# Patient Record
Sex: MALE | URBAN - METROPOLITAN AREA
[De-identification: names, ages, dates, MRNs, and addresses within clinical notes are randomized per-mention and may not be internally consistent; named-entity substitution may affect disease eponyms.]

---

## 2021-05-28 ENCOUNTER — APPOINTMENT (RX ONLY)
Dept: URBAN - METROPOLITAN AREA CLINIC 374 | Facility: CLINIC | Age: 26
Setting detail: DERMATOLOGY
End: 2021-05-28

## 2021-05-28 DIAGNOSIS — L40.8 OTHER PSORIASIS: ICD-10-CM

## 2021-05-28 PROCEDURE — ? PRESCRIPTION

## 2021-05-28 PROCEDURE — ? COUNSELING

## 2021-05-28 PROCEDURE — 99204 OFFICE O/P NEW MOD 45 MIN: CPT

## 2021-05-28 PROCEDURE — ? MEDICATION COUNSELING

## 2021-05-28 PROCEDURE — ? PRESCRIPTION MEDICATION MANAGEMENT

## 2021-05-28 RX ORDER — CLOBETASOL PROPIONATE 0.5 MG/G
1 CREAM TOPICAL BID
Qty: 1 | Refills: 3 | Status: ERX | COMMUNITY
Start: 2021-05-28

## 2021-05-28 RX ORDER — KETOCONAZOLE 20 MG/ML
1 SHAMPOO, SUSPENSION TOPICAL QDAY
Qty: 1 | Refills: 3 | Status: ERX | COMMUNITY
Start: 2021-05-28

## 2021-05-28 RX ORDER — CLOBETASOL PROPIONATE 0.5 MG/ML
1 SOLUTION TOPICAL QHS
Qty: 1 | Refills: 3 | Status: ERX | COMMUNITY
Start: 2021-05-28

## 2021-05-28 RX ADMIN — CLOBETASOL PROPIONATE 1: 0.5 SOLUTION TOPICAL at 00:00

## 2021-05-28 RX ADMIN — KETOCONAZOLE 1: 20 SHAMPOO, SUSPENSION TOPICAL at 00:00

## 2021-05-28 RX ADMIN — CLOBETASOL PROPIONATE 1: 0.5 CREAM TOPICAL at 00:00

## 2021-05-28 ASSESSMENT — LOCATION ZONE DERM: LOCATION ZONE: SCALP

## 2021-05-28 ASSESSMENT — LOCATION SIMPLE DESCRIPTION DERM: LOCATION SIMPLE: POSTERIOR SCALP

## 2021-05-28 ASSESSMENT — LOCATION DETAILED DESCRIPTION DERM: LOCATION DETAILED: POSTERIOR MID-PARIETAL SCALP

## 2021-05-28 ASSESSMENT — SEVERITY ASSESSMENT: HOW SEVERE IS THIS PATIENT'S CONDITION?: MILD

## 2021-05-28 ASSESSMENT — BSA PSORIASIS: % BODY COVERED IN PSORIASIS: 4

## 2021-05-28 ASSESSMENT — PGA PSORIASIS: PGA PSORIASIS 2020: MILD

## 2021-05-28 NOTE — PROCEDURE: MEDICATION COUNSELING
Xelbonniez Pregnancy And Lactation Text: This medication is Pregnancy Category D and is not considered safe during pregnancy.  The risk during breast feeding is also uncertain.

## 2021-05-28 NOTE — PROCEDURE: PRESCRIPTION MEDICATION MANAGEMENT
Initiate Treatment: clobetasol 0.05 % scalp solution Qhs: Apply a few scattered drops into scalp QHS.\\nketoconazole 2 % shampoo Qday: Shampoo scalp, let sit 3-5 min, then rinse every wash\\nclobetasol 0.05 % topical cream Bid: Apply to aa of leg for rash bid
Detail Level: Zone
Render In Strict Bullet Format?: No

## 2021-05-28 NOTE — PROCEDURE: MEDICATION COUNSELING
I will SWITCH the dose or number of times a day I take the medications listed below when I get home from the hospital:  None Tranexamic Acid Pregnancy And Lactation Text: It is unknown if this medication is safe during pregnancy or breast feeding.

## 2021-08-18 NOTE — PROCEDURE: MEDICATION COUNSELING
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

## 2024-08-06 NOTE — PROCEDURE: MEDICATION COUNSELING
Medicare Wellness Visit  Plan for Preventive Care     Harjeet,     Your blood pressure is good. Heart and lungs sound good.     I recommend efforts to lose weight by reducing caloric intake and with exercise.     You will receive a Cologuard kit. Send it back with stool.     I recommend having a tetanus booster (tdap) at the pharmacy.     I also recommend the Shingles vaccine at the pharmacy, 2 doses 2 to 6 months apart. Medicare will cover this at the pharmacy.     I recommend having the Prevnar 20 pneumonia shot. Medicare will cover that here or at the pharmacy.      Schedule a lab appointment to have your blood test done fasting. (No eating or drinking except for water for 8 hours.)    Let me see you in a year, 8/12/2025 at 8:30 AM for your annual exam. .     A good way for you to stay healthy is to use preventive care.  Medicare covers many services that can help you stay healthy.* The goal of these services is to find any health problems as quickly as possible. Finding problems early can help make them easier to treat.  Your personal plan below lists the services you may need and when they are due.      Health Maintenance Summary       DTaP/Tdap/Td Vaccine (1 - Tdap)  Overdue since 1/2/2002    Shingles Vaccine (1 of 2)  Never done    Colorectal Cancer Screen (Colonoscopy - Every 10 Years)  Overdue since 4/12/2020    Pneumococcal Vaccine 65+ (1 of 1 - PCV)  Never done    COVID-19 Vaccine (1 - 2023-24 season)  Never done    Medicare Advantage- Medicare Wellness Visit (Yearly - January to December)  Overdue since 1/1/2024    Influenza Vaccine (1)  Next due on 9/1/2024    Depression Screening (Yearly)  Next due on 8/5/2025    Hepatitis C Screening   Completed    Meningococcal Vaccine   Aged Out    Hepatitis B Vaccine (For Physician/[facility]C Discussion)   Aged Out    HPV Vaccine   Aged Out             Preventive Care for Women and Men    Heart Screenings (Cardiovascular):  Blood tests are used to check your cholesterol,  lipid and triglyceride levels. High levels can increase your risk for heart disease and stroke. High levels can be treated with medications, diet and exercise. Lowering your levels can help keep your heart and blood vessels healthy.  Your provider will order these tests if they are needed.    An ultrasound is done to see if you have an abdominal aortic aneurysm (AAA).  This is an enlargement of one of the main blood vessels that delivers blood to the body.   In the [place], 9,000 deaths are caused by AAA.  You may not even know you have this problem and as many as 1 in 3 people will have a serious problem if it is not treated.  Early diagnosis allows for more effective treatment and cure.  If you have a family history of AAA or are a male age 65-75 who has smoked, you are at higher risk of an AAA.  Your provider can order this test, if needed.    Colorectal Screening:  There are many tests that are used to check for cancer of your colon and rectum. You and your provider should discuss what test is best for you and when to have it done.  Options include:  Screening Colonoscopy: exam of the entire colon, seen through a flexible lighted tube.  Flexible Sigmoidoscopy: exam of the last third (sigmoid portion) of the colon and rectum, seen through a flexible lighted tube.  Cologuard DNA stool test: a sample of your stool is used to screen for cancer and unseen blood in your stool.  Fecal Occult Blood Test: a sample of your stool is studied to find any unseen blood    Flu Shot:  An immunization that helps to prevent influenza (the flu). You should get this every year. The best time to get the shot is in the fall.    Pneumococcal Shot:  Vaccines help prevent pneumococcal disease, which is any type of illness caused by Streptococcus pneumoniae bacteria. There are two kinds of pneumococcal vaccines available in the [place]:   Pneumococcal conjugate vaccines (PCV20 or Vaefkvf44®)  Pneumococcal polysaccharide  vaccine (PPSV23 or Uruecbcif25®)  For those who have never received any pneumococcal conjugate vaccine, CDC recommends PVC20 for adults 65 years or older and adults 19 through 64 years old with certain medical conditions or risk factors.   For those who have previously received PCV13, this should be followed by a dose of PPSV23.     Hepatitis B Shot:  An immunization that helps to protect people from getting Hepatitis B. Hepatitis B is a virus that spreads through contact with infected blood or body fluids. Many people with the virus do not have symptoms.  The virus can lead to serious problems, such as liver disease. Some people are at higher risk than others. Your doctor will tell you if you need this shot.     Diabetes Screening:  A test to measure sugar (glucose) in your blood is called a fasting blood sugar. Fasting means you cannot have food or drink for at least 8 hours before the test. This test can detect diabetes long before you may notice symptoms.    Glaucoma Screening:  Glaucoma screening is performed by your eye doctor. The test measures the fluid pressure inside your eyes to determine if you have glaucoma.     Hepatitis C Screening:  A blood test to see if you have the hepatitis C virus.  Hepatitis C attacks the liver and is a major cause of chronic liver disease.  Medicare will cover a single screening for all adults born between 1945 & 1965, or high risk patients (people who have injected illegal drugs or people who have had blood transfusions).  High risk patients who continue to inject illegal drugs can be screened for Hepatitis C every year.    Smoking and Tobacco-Use Cessation Counseling:  Tobacco is the single greatest cause of disease and early death in our country today. Medication and counseling together can increase a person’s chance of quitting for good.   Medicare covers two quitting attempts per year, with four counseling sessions per attempt (eight sessions in a 12 month  period)    Preventive Screening tests for Women    Screening Mammograms and Breast Exams:  An x-ray of your breasts to check for breast cancer before you or your doctor may be able to feel it.  If breast cancer is found early it can usually be treated with success.    Pelvic Exams and Pap Tests:  An exam to check for cervical and vaginal cancer. A Pap test is a lab test in which cells are taken from your cervix and sent to the lab to look for signs of cervical cancer. If cancer of the cervix is found early, chances for a cure are good. Testing can generally end at age 65, or if a woman has a hysterectomy for a benign condition. Your provider may recommend more frequent testing if certain abnormal results are found.    Bone Mass Measurements:  A painless x-ray of your bone density to see if you are at risk for a broken bone. Bone density refers to the thickness of bones or how tightly the bone tissue is packed.    Preventive Screening tests for Men    Prostate Screening:  Should you have a prostate cancer test (PSA)?  It is up to you to decide if you want a prostate cancer test. Talk to your clinician to find out if the test is right for you.  Things for you to consider and talk about should include:  Benefits and harms of the test  Your family history  How your race/ethnicity may influence the test  If the test may impact other medical conditions you have  Your values on screenings and treatments    *Medicare pays for many preventive services to keep you healthy. For some of these services, you might have to pay a deductible, coinsurance, and / or copayment.  The amounts vary depending on the type of services you need and the kind of Medicare health plan you have.    For further details on screenings offered by Medicare please visit: https://www.medicare.gov/coverage/preventive-screening-services      Xeljanz Counseling: I discussed with the patient the risks of Xeljanz therapy including increased risk of infection, liver issues, headache, diarrhea, or cold symptoms. Live vaccines should be avoided. They were instructed to call if they have any problems.